# Patient Record
Sex: MALE | Race: OTHER
[De-identification: names, ages, dates, MRNs, and addresses within clinical notes are randomized per-mention and may not be internally consistent; named-entity substitution may affect disease eponyms.]

---

## 2020-02-24 NOTE — NUR
BILATERAL DORSALIS PEDIS PULSE AUSCULTATED (BIPHASIC) WITH DOPPLER. EDMD SERRA NOTIFIED. SITES MARKED.

## 2020-05-28 ENCOUNTER — HOSPITAL ENCOUNTER (EMERGENCY)
Dept: HOSPITAL 8 - ED | Age: 65
Discharge: HOME | End: 2020-05-28
Payer: MEDICARE

## 2020-05-28 VITALS — DIASTOLIC BLOOD PRESSURE: 60 MMHG | SYSTOLIC BLOOD PRESSURE: 107 MMHG

## 2020-05-28 VITALS — BODY MASS INDEX: 26.73 KG/M2 | WEIGHT: 176.37 LBS | HEIGHT: 68 IN
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
## 2020-05-28 DIAGNOSIS — S80.01XA: ICD-10-CM
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
## 2020-05-28 DIAGNOSIS — M25.511: ICD-10-CM

## 2020-05-28 DIAGNOSIS — M51.36: ICD-10-CM
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
## 2020-05-28 DIAGNOSIS — S39.012A: Primary | ICD-10-CM

## 2020-05-28 PROCEDURE — 72110 X-RAY EXAM L-2 SPINE 4/>VWS: CPT

## 2020-05-28 PROCEDURE — 99284 EMERGENCY DEPT VISIT MOD MDM: CPT
